# Patient Record
Sex: MALE | Race: WHITE | Employment: FULL TIME | URBAN - METROPOLITAN AREA
[De-identification: names, ages, dates, MRNs, and addresses within clinical notes are randomized per-mention and may not be internally consistent; named-entity substitution may affect disease eponyms.]

---

## 2023-02-12 ENCOUNTER — HOSPITAL ENCOUNTER (EMERGENCY)
Age: 23
Discharge: HOME | End: 2023-02-12
Attending: EMERGENCY MEDICINE
Payer: COMMERCIAL

## 2023-02-12 VITALS
HEART RATE: 77 BPM | WEIGHT: 240 LBS | DIASTOLIC BLOOD PRESSURE: 77 MMHG | OXYGEN SATURATION: 100 % | HEIGHT: 70 IN | RESPIRATION RATE: 17 BRPM | SYSTOLIC BLOOD PRESSURE: 138 MMHG | BODY MASS INDEX: 34.36 KG/M2 | TEMPERATURE: 97.9 F

## 2023-02-12 DIAGNOSIS — K62.5 BRBPR (BRIGHT RED BLOOD PER RECTUM): Primary | ICD-10-CM

## 2023-02-12 PROCEDURE — 99283 EMERGENCY DEPT VISIT LOW MDM: CPT | Performed by: EMERGENCY MEDICINE

## 2023-02-12 RX ORDER — PANTOPRAZOLE SODIUM 20 MG/1
20 TABLET, DELAYED RELEASE ORAL
Qty: 30 TABLET | Refills: 0 | Status: SHIPPED | OUTPATIENT
Start: 2023-02-12

## 2023-02-12 ASSESSMENT — ENCOUNTER SYMPTOMS
BACK PAIN: 0
HEMATOCHEZIA: 1
RHINORRHEA: 0
COUGH: 0
SORE THROAT: 0
NAUSEA: 0
COLOR CHANGE: 0
ABDOMINAL PAIN: 0
RECTAL PAIN: 0
DIARRHEA: 0
ANAL BLEEDING: 1
CONSTIPATION: 0
VOMITING: 0
SHORTNESS OF BREATH: 0

## 2023-02-12 ASSESSMENT — PAIN SCALES - GENERAL: PAINLEVEL_OUTOF10: 3

## 2023-02-12 ASSESSMENT — PAIN DESCRIPTION - LOCATION: LOCATION: ABDOMEN

## 2023-02-12 ASSESSMENT — PAIN DESCRIPTION - DESCRIPTORS: DESCRIPTORS: DISCOMFORT

## 2023-02-12 ASSESSMENT — PAIN - FUNCTIONAL ASSESSMENT: PAIN_FUNCTIONAL_ASSESSMENT: 0-10

## 2023-02-12 NOTE — ED PROVIDER NOTES
Emergency Department Provider Note                   PCP:                None None               Age: 20 y.o. Sex: male       ICD-10-CM    1. BRBPR (bright red blood per rectum)  K62.5           DISPOSITION Decision To Discharge 02/12/2023 12:01:11 PM       Medical Decision Making  Patient with bright red blood per rectum last night. None since then. Negative exam.  Will discharge with GI follow-up. Risk  Prescription drug management. Complexity of Problem: 1 stable, acute illness. (3)        Considerations: Shared decision making was utilized in the care of this patient. Patient was discharged risks and benefits of hospitalization were discussed. No orders of the defined types were placed in this encounter. Medications - No data to display    New Prescriptions    PANTOPRAZOLE (PROTONIX) 20 MG TABLET    Take 1 tablet by mouth every morning (before breakfast)        Julio Bland is a 20 y.o. male who presents to the Emergency Department with chief complaint of    Chief Complaint   Patient presents with    Rectal Bleeding      Patient with 1 episode of bright red blood per rectum last night while at work. Has not had any since then. No abdominal pain. No rectal pain. No lightheadedness or weakness. Has had previous hemorrhoids over a year ago. The history is provided by the patient. No  was used. Rectal Bleeding  Quality:  Bright red  Amount: Moderate  Timing:  Rare  Chronicity:  New  Context: not constipation and not rectal pain    Relieved by:  Nothing  Worsened by:  Nothing  Associated symptoms: no abdominal pain, no fever, no light-headedness, no loss of consciousness and no vomiting    Risk factors: no anticoagulant use       Review of Systems   Constitutional:  Negative for chills and fever. HENT:  Negative for rhinorrhea and sore throat. Respiratory:  Negative for cough and shortness of breath.     Cardiovascular:  Negative for chest pain and palpitations. Gastrointestinal:  Positive for anal bleeding and hematochezia. Negative for abdominal pain, constipation, diarrhea, nausea, rectal pain and vomiting. Genitourinary:  Negative for dysuria and hematuria. Musculoskeletal:  Negative for back pain and neck pain. Skin:  Negative for color change and rash. Neurological:  Negative for loss of consciousness, light-headedness, numbness and headaches. All other systems reviewed and are negative. Past Medical History:   Diagnosis Date    ADHD         History reviewed. No pertinent surgical history. History reviewed. No pertinent family history. Social History     Socioeconomic History    Marital status: Single     Spouse name: None    Number of children: None    Years of education: None    Highest education level: None   Tobacco Use    Smoking status: Some Days     Types: Cigarettes    Smokeless tobacco: Never   Vaping Use    Vaping Use: Every day    Substances: Nicotine   Substance and Sexual Activity    Alcohol use: Yes    Drug use: Never        Allergies: Patient has no known allergies. Previous Medications    LISDEXAMFETAMINE (VYVANSE) 70 MG CAPSULE    Take 70 mg by mouth every morning. Vitals signs and nursing note reviewed. Patient Vitals for the past 4 hrs:   Temp Pulse Resp BP SpO2   02/12/23 1106 97.9 °F (36.6 °C) 77 17 138/77 100 %          Physical Exam  Vitals and nursing note reviewed. Constitutional:       Appearance: Normal appearance. HENT:      Head: Normocephalic and atraumatic. Cardiovascular:      Rate and Rhythm: Normal rate and regular rhythm. Pulmonary:      Effort: Pulmonary effort is normal.      Breath sounds: Normal breath sounds. No wheezing. Abdominal:      General: Bowel sounds are normal.      Palpations: Abdomen is soft. Tenderness: There is no abdominal tenderness. Genitourinary:     Rectum: Guaiac result negative (clean vault. no blood. no pain. no hemorrhoids. ). Musculoskeletal:         General: No swelling. Normal range of motion. Cervical back: Normal range of motion. No tenderness. Skin:     General: Skin is warm and dry. Neurological:      Mental Status: He is alert. Procedures        No results found for any visits on 02/12/23. No orders to display                         Voice dictation software was used during the making of this note. This software is not perfect and grammatical and other typographical errors may be present. This note has not been completely proofread for errors.      Liz Ross III, MD  02/12/23 3050

## 2023-02-12 NOTE — ED NOTES
I have reviewed discharge instructions with the patient. The patient verbalized understanding. Patient left ED via Discharge Method: ambulatory to Home with spouse. Opportunity for questions and clarification provided. Patient given 1 scripts. To continue your aftercare when you leave the hospital, you may receive an automated call from our care team to check in on how you are doing. This is a free service and part of our promise to provide the best care and service to meet your aftercare needs.  If you have questions, or wish to unsubscribe from this service please call [phone]. Thank you for Choosing our [address] [place] [address] Emergency Department.        Venus Serra RN  02/12/23 1212

## 2023-02-12 NOTE — ED TRIAGE NOTES
Pt states he had a bowel movement at work last night and the toilet was full of dark red blood and clots. Pt states he has a history of hemorrhoids. Pt states he has an abdominal discomfort since the bowel movement.  Pt denies rectal pain but says it does not feel normal.